# Patient Record
Sex: FEMALE | Race: OTHER
[De-identification: names, ages, dates, MRNs, and addresses within clinical notes are randomized per-mention and may not be internally consistent; named-entity substitution may affect disease eponyms.]

---

## 2020-11-22 ENCOUNTER — HOSPITAL ENCOUNTER (INPATIENT)
Dept: HOSPITAL 15 - LDRP | Age: 20
LOS: 2 days | Discharge: HOME | DRG: 560 | End: 2020-11-24
Attending: OBSTETRICS & GYNECOLOGY | Admitting: OBSTETRICS & GYNECOLOGY
Payer: MEDICAID

## 2020-11-22 VITALS — BODY MASS INDEX: 35.36 KG/M2 | WEIGHT: 220 LBS | HEIGHT: 66 IN

## 2020-11-22 DIAGNOSIS — M54.5: ICD-10-CM

## 2020-11-22 DIAGNOSIS — G89.29: ICD-10-CM

## 2020-11-22 DIAGNOSIS — U07.1: ICD-10-CM

## 2020-11-22 DIAGNOSIS — Z3A.38: ICD-10-CM

## 2020-11-22 PROCEDURE — 86901 BLOOD TYPING SEROLOGIC RH(D): CPT

## 2020-11-22 PROCEDURE — 80053 COMPREHEN METABOLIC PANEL: CPT

## 2020-11-22 PROCEDURE — 87426 SARSCOV CORONAVIRUS AG IA: CPT

## 2020-11-22 PROCEDURE — 86703 HIV-1/HIV-2 1 RESULT ANTBDY: CPT

## 2020-11-22 PROCEDURE — 87340 HEPATITIS B SURFACE AG IA: CPT

## 2020-11-22 PROCEDURE — 86592 SYPHILIS TEST NON-TREP QUAL: CPT

## 2020-11-22 PROCEDURE — 81001 URINALYSIS AUTO W/SCOPE: CPT

## 2020-11-22 PROCEDURE — 96372 THER/PROPH/DIAG INJ SC/IM: CPT

## 2020-11-22 PROCEDURE — 86900 BLOOD TYPING SEROLOGIC ABO: CPT

## 2020-11-22 PROCEDURE — 85730 THROMBOPLASTIN TIME PARTIAL: CPT

## 2020-11-22 PROCEDURE — 59409 OBSTETRICAL CARE: CPT

## 2020-11-22 PROCEDURE — 85610 PROTHROMBIN TIME: CPT

## 2020-11-22 PROCEDURE — 80307 DRUG TEST PRSMV CHEM ANLYZR: CPT

## 2020-11-22 PROCEDURE — 86762 RUBELLA ANTIBODY: CPT

## 2020-11-22 PROCEDURE — 86850 RBC ANTIBODY SCREEN: CPT

## 2020-11-22 PROCEDURE — 36415 COLL VENOUS BLD VENIPUNCTURE: CPT

## 2020-11-22 PROCEDURE — 59025 FETAL NON-STRESS TEST: CPT

## 2020-11-22 PROCEDURE — 85025 COMPLETE CBC W/AUTO DIFF WBC: CPT

## 2020-11-23 VITALS — SYSTOLIC BLOOD PRESSURE: 97 MMHG | DIASTOLIC BLOOD PRESSURE: 50 MMHG

## 2020-11-23 VITALS — DIASTOLIC BLOOD PRESSURE: 57 MMHG | SYSTOLIC BLOOD PRESSURE: 102 MMHG

## 2020-11-23 VITALS — DIASTOLIC BLOOD PRESSURE: 61 MMHG | SYSTOLIC BLOOD PRESSURE: 110 MMHG

## 2020-11-23 VITALS — SYSTOLIC BLOOD PRESSURE: 107 MMHG | DIASTOLIC BLOOD PRESSURE: 58 MMHG

## 2020-11-23 VITALS — DIASTOLIC BLOOD PRESSURE: 71 MMHG | SYSTOLIC BLOOD PRESSURE: 118 MMHG

## 2020-11-23 VITALS — SYSTOLIC BLOOD PRESSURE: 113 MMHG | DIASTOLIC BLOOD PRESSURE: 54 MMHG

## 2020-11-23 LAB
ALBUMIN SERPL-MCNC: 3.2 G/DL (ref 3.4–5)
ALCOHOL, URINE: 7 MG/DL (ref 0–10)
ALP SERPL-CCNC: 140 U/L (ref 45–117)
ALT SERPL-CCNC: 13 U/L (ref 13–56)
AMPHETAMINES UR QL SCN: NEGATIVE
ANION GAP SERPL CALCULATED.3IONS-SCNC: 10 MMOL/L (ref 5–15)
APTT PPP: 27.2 SEC (ref 23–31.2)
BARBITURATES UR QL SCN: NEGATIVE
BENZODIAZ UR QL SCN: NEGATIVE
BILIRUB SERPL-MCNC: 0.3 MG/DL (ref 0.2–1)
BUN SERPL-MCNC: 8 MG/DL (ref 7–18)
BUN/CREAT SERPL: 10.1
BZE UR QL SCN: NEGATIVE
CALCIUM SERPL-MCNC: 8.9 MG/DL (ref 8.5–10.1)
CANNABINOIDS UR QL SCN: POSITIVE
CHLORIDE SERPL-SCNC: 106 MMOL/L (ref 98–107)
CO2 SERPL-SCNC: 21 MMOL/L (ref 21–32)
GLUCOSE SERPL-MCNC: 119 MG/DL (ref 74–106)
HCT VFR BLD AUTO: 37.1 % (ref 36–46)
HGB BLD-MCNC: 11.6 G/DL (ref 12.2–16.2)
INR PPP: 0.94 (ref 0.9–1.15)
MCH RBC QN AUTO: 23.6 PG (ref 28–32)
MCV RBC AUTO: 75.2 FL (ref 80–100)
NRBC BLD QL AUTO: 0.1 %
OPIATES UR QL SCN: NEGATIVE
PCP UR QL SCN: NEGATIVE
POTASSIUM SERPL-SCNC: 3.7 MMOL/L (ref 3.5–5.1)
PROT SERPL-MCNC: 8 G/DL (ref 6.4–8.2)
SODIUM SERPL-SCNC: 137 MMOL/L (ref 136–145)

## 2020-11-23 NOTE — NUR
, Kirsten called, informed that we do not have a drug screen on baby yet, she 
will call back in the morning.

## 2020-11-23 NOTE — NUR
MOTHER OF INFANT INFORMED OF NEED TO DO COVID SWAB OF FEMALE INFANT,THE PT AGREES WITH THE 
PLAN OF CARE

## 2020-11-23 NOTE — NUR
Ambulation:

Patient OOB with standby assistance by RN.  Patient ambulated to bathroom with steady gait.  
Patient able to void 300ml without difficulty.  Pericare teaching provided with returned 
demonstration by patient.  Clean gown provided and bed linen changed.  Patient ambulated 
back to bed with steady gait and no distress noted.

## 2020-11-24 VITALS — SYSTOLIC BLOOD PRESSURE: 100 MMHG | DIASTOLIC BLOOD PRESSURE: 58 MMHG

## 2020-11-24 VITALS — DIASTOLIC BLOOD PRESSURE: 83 MMHG | SYSTOLIC BLOOD PRESSURE: 120 MMHG

## 2020-11-24 VITALS — SYSTOLIC BLOOD PRESSURE: 137 MMHG | DIASTOLIC BLOOD PRESSURE: 78 MMHG

## 2020-11-24 LAB — RUBV IGG SERPL IA-ACNC: 1.08 INDEX

## 2020-11-24 NOTE — NUR
Discharge:

Patient taken to vehicle via ambulatory with all personal belongings, accompanied by staff.  
No distress noted at time of departure, no adverse changes in status since initial 
assessment.

## 2020-11-24 NOTE — NUR
assessment

Patient is a 20 year old female who is alert and oriented. Per ss consult no prenatal care, 
positive THC. Patient has admitted to smoking marijuana 1 to 2 months ago. Patient stated 
she found out late that she was pregnant and by the time she got situated she could not find 
an OB/GYN. Patient stated FOB is not involved. Patient stated she has good family support. 
Patient stated she has all provisions for the baby. I informed patient I will be making a 
CPS report. Patient verbalized understanding. CPS report made to CPS  
[place] report # [id number].

-------------------------------------------------------------------------------

Addendum: 11/24/20 at 1716 by Kirsten Tony 

-------------------------------------------------------------------------------

Amended: Links added.

## 2020-11-24 NOTE — NUR
Discharge:

Discharge instructions given as ordered. Pt encouraged to follow up with OB/GYN. Public 
Health information provided for PT to make a virtual follow up visit due to being positive 
for COVID19.  All questions and concerns addressed.  Patient verbalized understanding.  
Medication reconciliation completed and copy given to patient.

## 2021-04-11 ENCOUNTER — HOSPITAL ENCOUNTER (EMERGENCY)
Dept: HOSPITAL 15 - ER | Age: 21
Discharge: LEFT BEFORE DISCHARGE | End: 2021-04-11
Payer: MEDICAID

## 2021-04-11 VITALS — BODY MASS INDEX: 28.14 KG/M2 | HEIGHT: 71 IN | WEIGHT: 201 LBS

## 2021-04-11 VITALS — SYSTOLIC BLOOD PRESSURE: 120 MMHG | DIASTOLIC BLOOD PRESSURE: 71 MMHG

## 2021-04-11 DIAGNOSIS — D64.9: ICD-10-CM

## 2021-04-11 DIAGNOSIS — R94.5: ICD-10-CM

## 2021-04-11 DIAGNOSIS — F41.0: Primary | ICD-10-CM

## 2021-04-11 LAB
ALBUMIN SERPL-MCNC: 3.1 G/DL (ref 3.4–5)
ALP SERPL-CCNC: 189 U/L (ref 45–117)
ALT SERPL-CCNC: 213 U/L (ref 13–56)
ANION GAP SERPL CALCULATED.3IONS-SCNC: 6 MMOL/L (ref 5–15)
BILIRUB SERPL-MCNC: 0.7 MG/DL (ref 0.2–1)
BUN SERPL-MCNC: 8 MG/DL (ref 7–18)
BUN/CREAT SERPL: 11.8
CALCIUM SERPL-MCNC: 9 MG/DL (ref 8.5–10.1)
CHLORIDE SERPL-SCNC: 105 MMOL/L (ref 98–107)
CO2 SERPL-SCNC: 27 MMOL/L (ref 21–32)
GLUCOSE SERPL-MCNC: 106 MG/DL (ref 74–106)
HCG SERPL-ACNC: < 1 MLU/ML (ref 1–3)
HCT VFR BLD AUTO: 28.9 % (ref 36–46)
HGB BLD-MCNC: 9.2 G/DL (ref 12.2–16.2)
MCH RBC QN AUTO: 19.9 PG (ref 28–32)
MCV RBC AUTO: 62.4 FL (ref 80–100)
NRBC BLD QL AUTO: 0 %
POTASSIUM SERPL-SCNC: 3.5 MMOL/L (ref 3.5–5.1)
PROT SERPL-MCNC: 7.8 G/DL (ref 6.4–8.2)
SODIUM SERPL-SCNC: 138 MMOL/L (ref 136–145)
TSH SERPL DL<=0.05 MIU/L-ACNC: 0.5 UIU/ML (ref 0.36–3.74)

## 2021-04-11 PROCEDURE — 80053 COMPREHEN METABOLIC PANEL: CPT

## 2021-04-11 PROCEDURE — 84443 ASSAY THYROID STIM HORMONE: CPT

## 2021-04-11 PROCEDURE — 84484 ASSAY OF TROPONIN QUANT: CPT

## 2021-04-11 PROCEDURE — 85025 COMPLETE CBC W/AUTO DIFF WBC: CPT

## 2021-04-11 PROCEDURE — 85379 FIBRIN DEGRADATION QUANT: CPT

## 2021-04-11 PROCEDURE — 84702 CHORIONIC GONADOTROPIN TEST: CPT

## 2021-04-11 PROCEDURE — 36415 COLL VENOUS BLD VENIPUNCTURE: CPT

## 2021-04-11 PROCEDURE — 93005 ELECTROCARDIOGRAM TRACING: CPT
